# Patient Record
Sex: MALE | Race: WHITE | URBAN - METROPOLITAN AREA
[De-identification: names, ages, dates, MRNs, and addresses within clinical notes are randomized per-mention and may not be internally consistent; named-entity substitution may affect disease eponyms.]

---

## 2019-01-23 ENCOUNTER — OFFICE VISIT (OUTPATIENT)
Dept: ONCOLOGY | Facility: CLINIC | Age: 59
End: 2019-01-23
Attending: GENETIC COUNSELOR, MS
Payer: COMMERCIAL

## 2019-01-23 DIAGNOSIS — Z80.3 FAMILY HISTORY OF MALIGNANT NEOPLASM OF BREAST: ICD-10-CM

## 2019-01-23 DIAGNOSIS — Z80.49 FAMILY HISTORY OF UTERINE CANCER: ICD-10-CM

## 2019-01-23 DIAGNOSIS — Z80.42 FAMILY HISTORY OF PROSTATE CANCER: ICD-10-CM

## 2019-01-23 DIAGNOSIS — Z80.8 FAMILY HISTORY OF MALIGNANT MELANOMA: ICD-10-CM

## 2019-01-23 DIAGNOSIS — Z80.41 FAMILY HISTORY OF MALIGNANT NEOPLASM OF OVARY: ICD-10-CM

## 2019-01-23 DIAGNOSIS — Z80.0 FAMILY HISTORY OF PANCREATIC CANCER: ICD-10-CM

## 2019-01-23 DIAGNOSIS — Z84.81 FAMILY HISTORY OF BRCA GENE MUTATION: Primary | ICD-10-CM

## 2019-01-23 DIAGNOSIS — Z84.81 FAMILY HISTORY OF BRCA GENE MUTATION: ICD-10-CM

## 2019-01-23 LAB — MISCELLANEOUS TEST: NORMAL

## 2019-01-23 PROCEDURE — 96040 ZZH GENETIC COUNSELING, EACH 30 MINUTES: CPT | Mod: ZF | Performed by: GENETIC COUNSELOR, MS

## 2019-01-23 PROCEDURE — 36415 COLL VENOUS BLD VENIPUNCTURE: CPT

## 2019-01-23 NOTE — LETTER
Cancer Risk Management  Program Locations    [facility] Cancer [facility]  [facility] Cancer [facility]  Mailing Address  Cancer Risk Management Program  [facility]  [address]  [place] [address]    New patient appointments  [phone]  January 25, 2019    Genaro Hampton  [address] [address]      Dear Genaro,    It was a pleasure meeting with you at the [facility] on 1/23/2019.  Here is a copy of the progress note from your recent genetic counseling visit to the Cancer Risk Management Program.  If you have any additional questions, please feel free to call.    Referring Provider: self referred    Presenting Information:   I met with Genaro Memo today for genetic counseling at the Cancer Risk Management Program at the [facility] to discuss hist paternal family history of hereditary breast and ovarian cancer syndrome and maternal family history of pancreatic, breast and ovarian cancer.  He is here today to discuss available genetic testing options.  He was seen today with his daughter, Marlee.    Personal History:  Genaro is a 58 year old male.   He reports a history of non-melanoma skin cancer in his 50's, and sees a dermatologist annually.  He is regularly followed by his primary care provider, including prostate cancer screening.  Genaro began having colonoscopies at the age of 39, and reports one polyp from a colonoscopy completed 2 years ago; follow up was recommended in 5 years.  Genaro reported no tobacco use, and no concerns regarding alcohol use or environmental exposures.    Family History: (Please see scanned pedigree for detailed family history information)    Genaro has a 19 year old daughter and 15 year old son.      His mother was diagnosed with pancreatic cancer at age 63 and has  passed away    One maternal first cousin was diagnosed with breast cancer in her 60's and also has a history of ovarian cancer    His father was diagnosed with prostate cancer at age 70 and may have had a history of colon polyps    One paternal half sister was diagnosed with breast cancer at age 50 and uterine cancer at age 60.  She completed genetic testing which he reports found a BRCA gene mutation.  Additional details were not available today, however Genaro was encouraged to follow up should he obtain a copy of his sister's positive test report.    One paternal half sister was diagnosed with breast cancer at age 51 and melanoma in her teens.  Her daughter was diagnosed with breast cancer at age 38 and reportedly carries the familial BRCA1 or BRCA2 gene mutation.    One paternal uncle was diagnosed with throat cancer in his 70's.     His ethnicity is Romansh/Croatian/Bruneian. There is no known Ashkenazi Restoration ancestry on either side of his family.     Discussion:    Genaro has a paternal family history of Hereditary Breast and Ovarian Cancer syndrome.  His maternal family history of pancreatic, breast and ovarian cancer is also suggestive of a possible hereditary cancer syndrome.        We discussed the natural history and genetics of Hereditary Breast and Ovarian Cancer syndrome caused by mutations in the BRCA1/2 genes. We reviewed that mutations in these genes are inherited in an autosomal dominant pattern.  It is not clear whether this mutation identified in his paternal half sisters was inherited from Genaro's father.  This means that Genaro has a 25% chance of carrying the same mutation which was identified in his paternal half siblings.     Genaro's maternal family history, including his mother's history of pancreatic cancer and first cousin who has a history of breast and ovarian cancer, is also suggestive of a hereditary cancer syndrome.  Based on his maternal family history, Genaro meets current National  Comprehensive Caner Network (NCCN) guidelines for genetic testing of BRCA1 and BRCA2    We discussed that there are additional genes that could cause increased risk for pancreatic and ovarian cancer.  As many of these genes present with overlapping features in a family and accurate cancer risk cannot always be established based upon the pedigree analysis alone, it would be reasonable for Genaro to consider panel genetic testing to analyze multiple genes at once.    A detailed handout regarding hereditary breast and ovarian cancer and the information we discussed was provided to Genaro at the end of our appointment today and can be found in the after visit summary.  Topics included: inheritance pattern, cancer risks, cancer screening recommendations, and also risks, benefits and limitations of testing.    We discussed guidelines-based and expanded genetic testing options available to Genaro due to his maternal family history of pancreatic and ovarian cancer, and paternal family history of a BRCA1 or BRCA2 gene mutation.  Genaro expressed interest in learning as much information as possible from testing.  He elected to proceed with the CancerNext panel offered through [employer].  Genetic testing is available for 34 genes associated with hereditary cancer: CancerNext (APC, KARMEN, BARD1, BRCA1, BRCA2, BRIP1, BMPR1A, CDH1, CDK4, CDKN2A, CHEK2, DICER1, EPCAM, GREM1, HOXB13, MLH1, MRE11A, MSH2, MSH6, MUTYH, NBN, NF1, PALB2, PMS2, POLD1, POLE, PTEN, RAD50, RAD51C, RAD51D, SMAD4, SMARCA4, STK11, and TP53).  We discussed that many of the genes in the CancerNext panel have known risks and published management guidelines.  Some genes are associated with specific hereditary cancer syndromes: Hereditary Breast and Ovarian Cancer syndrome (BRCA1, BRCA2), Cartagena syndrome (MLH1, MSH2, MSH6, PMS2, EPCAM), Familial Adenomatous Polyposis syndrome (APC), Hereditary Gastric Cancer syndrome (CDH1), Familial Atypical Multiple Mole  Melanoma syndrome (CDK4, CDKN2A), Juvenile Polyposis syndrome (BMPR1A, SMAD4), Cowden syndrome (PTEN), Li Fraumeni syndrome (TP53), Peutz-Jeghers syndrome (STK11), MUTYH Associated Polyposis syndrome (MUTYH), and Neurofibromatosis type 1 (NF1).   The KARMEN, BRIP1, CHEK2, GREM1, NBN , PALB2, POLD1, POLE, RAD51C, and RAD51D genes are associated with increased cancer risk and have published management guidelines for certain cancers.    The remaining genes (BARD1, DICER1, HOXB13, MRE11A, RAD50, and SMARCA4) are associated with increased cancer risk and may allow us to make medical recommendations when mutations are identified.    Genaro was provided with a brochure from [employer] explaining the CancerNext testing.  Consent was obtained and genetic testing for CancerNext was sent to [facility]. Turn around time: 3-4 weeks.    Medical Management: For Genaro, we reviewed that the information from genetic testing may determine additional cancer screening  (i.e. male breast exams, more frequent colonoscopies, more frequent dermatologic exams, etc.) and targeted chemotherapies for certain cancers if needed in the future (i.e. Immunotherapy for individuals with Cartagena syndrome, PARP inhibitors, etc.).  We reviewed current NCCN screening recommendations for men who carry a mutation in the BRCA1 or BRCA2 genes.  These recommendations will be discussed in detail once genetic testing is completed.    Plan:  1) Today Genaro elected to proceed with the CancerNext gene panel offered through [employer].  2) This information should be available in 3-4 weeks.  3) Genaro should return to clinic in 4-5 weeks to discuss the results    Karishma Snider MS, [facility]  Licensed Genetic Counselor  [phone]

## 2019-01-23 NOTE — PATIENT INSTRUCTIONS
Assessing Cancer Risk  Only about 5-10% of cancers are thought to be due to an inherited cancer susceptibility gene.    These families often have:    Several people with the same or related types of cancer    Cancers diagnosed at a young age (before age 50)    Individuals with more than one primary cancer    Multiple generations of the family affected with cancer    Some people may be candidates for genetic testing of more than one gene.  For these families, genetic testing using a cancer panel may be offered.  These panels will test different genes known to increase the risk for breast, ovarian, uterine, and/or other cancers. All of the genes discussed below have published clinical management guidelines for individuals who are found to carry a mutation. The purpose of this handout is to serve as a brief summary of the genes analyzed by the panels used to inquire about hereditary breast and gynecologic cancer:  KARMEN, BRCA1, BRCA2, BRIP1, CDH1, CHEK2, MLH1, MSH2, MSH6, PMS2, EPCAM, PTEN, PALB2, RAD51C, RAD51D, and TP53.  ______________________________________________________________________________  Hereditary Breast and Ovarian Cancer Syndrome   (BRCA1 and BRCA2)  A single mutation in one of the copies of BRCA1 or BRCA2 increases the risk for breast and ovarian cancer, among others.  The risk for pancreatic cancer and melanoma may also be slightly increased in some families.  The chart below shows the chance that someone with a BRCA mutation would develop cancer in his or her lifetime1,2,3,4.        A person s ethnic background is also important to consider, as individuals of Ashkenazi Voodoo ancestry have a higher chance of having a BRCA gene mutation.  There are three BRCA mutations that occur more frequently in this population.    Cartagena Syndrome   (MLH1, MSH2, MSH6, PMS2, and EPCAM)  Currently five genes are known to cause Cartagena Syndrome: MLH1, MSH2, MSH6, PMS2, and EPCAM.  A single mutation in one of the  Cartagena Syndrome genes increases the risk for colon, endometrial, ovarian, and stomach cancers.  Other cancers that occur less commonly in Cartagena Syndrome include urinary tract, skin, and brain cancers.  The chart below shows the chance that a person with Cartagena syndrome would develop cancer in his or her lifetime5.      *Cancer risk varies depending on Cartagena syndrome gene found    Cowden Syndrome   (PTEN)  Cowden syndrome is a hereditary condition that increases the risk for breast, thyroid, endometrial, colon, and kidney cancer.  Cowden syndrome is caused by a mutation in the PTEN gene.  A single mutation in one of the copies of PTEN causes Cowden syndrome and increases cancer risk.  The chart below shows the chance that someone with a PTEN mutation would develop cancer in their lifetime6,7.  Other benign features seen in some individuals with Cowden syndrome include benign skin lesions (facial papules, keratoses, lipomas), learning disability, autism, thyroid nodules, colon polyps, and larger head size.      *One recent study found breast cancer risk to be increased to 85%    Li-Fraumeni Syndrome   (TP53)  Li-Fraumeni Syndrome (LFS) is a cancer predisposition syndrome caused by a mutation in the TP53 gene. A single mutation in one of the copies of TP53 increases the risk for multiple cancers. Individuals with LFS are at an increased risk for developing cancer at a young age. The lifetime risk for development of a LFS-associated cancer is 50% by age 30 and 90% by age 60.   Core Cancers: Sarcomas, Breast, Brain, Lung, Leukemias/Lymphomas, Adrenocortical carcinomas  Other Cancers: Gastrointestinal, Thyroid, Skin, Genitourinary    Hereditary Diffuse Gastric Cancer   (CDH1)  Currently, one gene is known to cause hereditary diffuse gastric cancer (HDGC): CDH1.  Individuals with HDGC are at increased risk for diffuse gastric cancer and lobular breast cancer. Of people diagnosed with HDGC, 30-50% have a mutation in the CDH1  gene.  This suggests there are likely other genes that may cause HDGC that have not been identified yet.      Lifetime Cancer Risks    General Population HDGC    Diffuse Gastric  <1% ~80%   Breast 12% 39-52%         Additional Genes  KARMEN  KARMEN is a moderate-risk breast cancer gene. Women who have a mutation in KARMEN can have between a 2-4 fold increased risk for breast cancer compared to the general population8. KARMEN mutations have also been associated with increased risk for pancreatic cancer, however an estimate of this cancer risk is not well understood9. Individuals who inherit two KARMEN mutations have a condition called ataxia-telangiectasia (AT).  This rare autosomal recessive condition affects the nervous system and immune system, and is associated with progressive cerebellar ataxia beginning in childhood.  Individuals with ataxia-telangiectasia often have a weakened immune system and have an increased risk for childhood cancers.    PALB2  Mutations in PALB2 have been shown to increase the risk of breast cancer up to 33-58% in some families; where individuals fall within this risk range is dependent upon family [id number]. PALB2 mutations have also been associated with increased risk for pancreatic cancer, although this risk has not been quantified yet.  Individuals who inherit two PALB2 mutations--one from their mother and one from their father--have a condition called Fanconi Anemia.  This rare autosomal recessive condition is associated with short stature, developmental delay, bone marrow failure, and increased risk for childhood cancers.    CHEK2   CHEK2 is a moderate-risk breast cancer gene.  Women who have a mutation in CHEK2 have around a 2-fold increased risk for breast cancer compared to the general population, and this risk may be higher depending upon family history.11,12,13 Mutations in CHEK2 have also been shown to increase the risk of a number of other cancers, including colon and prostate, however  these cancer risks are currently not well understood.    BRIP1, RAD51C and RAD51D  Mutations in BRIP1, RAD51C, and RAD51D have been shown to increase the risk of ovarian cancer and possibly female breast cancer as well14,15 .       Lifetime Cancer Risk    General Population BRIP1 RAD51C RAD51D   Ovarian 1-2% ~5-8% ~5-9% ~7-15%           Inheritance  All of the cancer syndromes reviewed above are inherited in an autosomal dominant pattern.  This means that if a parent has a mutation, each of his or her children will have a 50% chance of inheriting that same mutation.  Therefore, each child--male or female--would have a 50% chance of being at increased risk for developing cancer.      Image obtained from Genetics Home Reference, 2013     Mutations in some genes can occur de ary, which means that a person s mutation occurred for the first time in them and was not inherited from a parent.  Now that they have the mutation, however, it can be passed on to future generations.    Genetic Testing  Genetic testing involves a blood test and will look at the genetic information in the KARMEN, BRCA1, BRCA2, BRIP1, CDH1, CHEK2, MLH1, MSH2, MSH6, PMS2, EPCAM, PTEN, PALB2, RAD51C, RAD51D, and TP53 genes for any harmful mutations that are associated with increased cancer risk.  If possible, it is recommended that the person(s) who has had cancer be tested before other family members.  That person will give us the most useful information about whether or not a specific gene is associated with the cancer in the family.    Results  There are three possible results of genetic testing:    Positive--a harmful mutation was identified in one or more of the genes    Negative--no mutation was identified in any of the genes on this panel    Variant of unknown significance--a variation in one of the genes was identified, but it is unclear how this impacts cancer risk in the family    Advantages and Disadvantages   There are advantages and  disadvantages to genetic testing.    Advantages    May clarify your cancer risk    Can help you make medical decisions    May explain the cancers in your family    May give useful information to your family members (if you share your results)    Disadvantages    Possible negative emotional impact of learning about inherited cancer risk    Uncertainty in interpreting a negative test result in some situations    Possible genetic discrimination concerns (see below)    Genetic Information Nondiscrimination Act (HAJA)  HAJA is a federal law that protects individuals from health insurance or employment discrimination based on a genetic test result alone.  Although rare, there are currently no legal discrimination protections in terms of life insurance, long term care, or disability insurances.  Visit the [employer] [place] website to learn more.    Reducing Cancer Risk  All of the genes described above have nationally recognized cancer screening guidelines that would be recommended for individuals who test positive.  In addition to increased cancer screening, surgeries may be offered or recommended to reduce cancer risk.  Recommendations are based upon an individual s genetic test result as well as their personal and family history of cancer.    Questions to Think About Regarding Genetic Testing:    What effect will the test result have on me and my relationship with my family members if I have an inherited gene mutation?  If I don t have a gene mutation?    Should I share my test results, and how will my family react to this news, which may also affect them?    Are my children ready to learn new information that may one day affect their own health?    Hereditary Cancer Resources    FORCE: Facing Our Risk of Cancer Empowered facingourrisk.org   Bright Pink bebrightpink.org   Li-Fraumeni Syndrome Association lfsassociation.org   PTEN World PTENworld.com   No stomach for cancer, Inc.  nostomachforcancer.org   Stomach cancer relief network Scrnet.org   Collaborative Group of the Americas on Inherited Colorectal Cancer (CGA) cgaicc.com    Cancer Care cancercare.org   American Cancer Society (ACS) cancer.org   National Cancer [place] (NCI) cancer.gov     Please call us if you have any questions or concerns.   Cancer Risk Management Program [phone] ([phone])  ? Yina Burns, MS, [facility]  [phone]  ? Diamond Basurto, MS, [facility]  [phone]  ? Karishma Snider, MS, [facility]  [phone]  ? Dale Yañez, MS, [facility]  [phone]  ? Areli Xavi, MS, [facility] [phone]    References  1. Spencer CLIFTON, Dada PDP, Augusta S, Chandana MIXON, Ron JE, Cristal JL, Flo N, Donis H, Rashi O, Phil A, Rakesh B, Phoenix P, Cora S, Pb DM, Steven N, Vasu E, Suri H, Justus E, Lubinski J, Gronwald J, Cal B, Tulinius H, Thorlacius S, Eerola H, Won H, Darlin K, Brunilda OP. Average risks of breast and ovarian cancer associated with BRCA1 or BRCA2 mutations detected in case series unselected for family history: a combined analysis of 222 studies. Am J Hum Aysha. 2003;72:1117-30.  2. Tanya N, Deja M, Radha G.  BRCA1 and BRCA2 Hereditary Breast and Ovarian Cancer. Gene Reviews online. 2013.  3. Hilario YC, Mónica S, Haleigh G, Warren S. Breast cancer risk among male BRCA1 and BRCA2 mutation carriers. J Natl Cancer Inst. 2007;99:1811-4.  4. Flako RAMÍREZ, Leann I, John J, Joby E, Adelina ER, Jose F. Risk of breast cancer in male BRCA2 carriers. J Med Aysha. 2010;47:710-1.  5. National Comprehensive Cancer Network. Clinical practice guidelines in oncology, colorectal cancer screening. Available online (registration required). 2015.  6. Dhruv ASHTON, Juan A J, Babak J, Raoul LA, Andrei MS, Eng C. Lifetime cancer risks in individuals with germline PTEN mutations. Clin Cancer Res. 2012;18:400-7.  7. Mundo ANN. Cowden Syndrome: A Critical Review of the Clinical Literature. J Aysha .  2009:18:13-27.  8. Bella A, Livan D, Baron S, Francheska P, Hank T, Gino M, Earnest B, Jairo H, Rahat R, Edwige K, Everardo L, Flako DG, Pb D, Bryan DF, Venus MR, The Breast Cancer Susceptibility Collaboration () & Abbe MAHER. KARMEN mutations that cause ataxia-telangiectasia are breast cancer susceptibility alleles. Nature Genetics. 2006;38:873-875  9. Enrrique N , Chava Y, Ritu J, Tom L, Audie GM , Dhruv ML, Gallinger S, Trevino AG, Syngal S, Nanci ML, Jerrell J , Mariah R, Jama SZ, Ting JR, Dodie VE, Miriam M, Vojoe B, Sunny N, Abdelrahman RH, Radha KW, and Sintia AP. KARMEN mutations in patients with hereditary pancreatic cancer. Cancer Discover. 2012;2:41-46  10. Spencer MUKHERJEE, et al. Breast-Cancer Risk in Families with Mutations in PALB2. NEJM. 2014; 371(6):497-506.  11. CHEK2 Breast Cancer Case-Control Consortium. CHEK2*1100delC and susceptibility to breast cancer: A collaborative analysis involving 10,860 breast cancer cases and 9,065 controls from 10 studies. Am J Hum Aysha, 74 (2004), pp. 4908-6637  12. Elida T, Denice S, Sergio K, et al. Spectrum of Mutations in BRCA1, BRCA2, CHEK2, and TP53 in Families at High Risk of Breast Cancer. ARMINDA. 2006;295(12):1305-2773.   13. Jf C, Kerwin D, Eduardo A, et al. Risk of breast cancer in women with a CHEK2 mutation with and without a family history of breast cancer. J Clin Oncol. 2011;29:1128-3526.  14. Benjamin KHAN, Pollo E, Ash SJ, et al. Contribution of germline mutations in the RAD51B, RAD51C, and RAD51D genes to ovarian cancer in the population. J Clin Oncol. 2015;33(26):9034-9254. Doi:10.1200/JCO.2015.61.2408.  15. Jake T, Cecilio HIRSCH, Robbie P, et al. Mutations in BRIP1 confer high risk of ovarian cancer. Sakina Aysha. 2011;43(11):6289-5137. doi:10.1038/ng.955.

## 2019-01-23 NOTE — LETTER
1/23/2019       RE: Genaro Hampton  [address] [address]     Dear Colleague,    Thank you for referring your patient, Genaro Hampton, to the [facility]. Please see a copy of my visit note below.    1/23/2019    Referring Provider: self referred    Presenting Information:   I met with Genaro Hampton today for genetic counseling at the Cancer Risk Management Program at the [facility] to discuss hist paternal family history of hereditary breast and ovarian cancer syndrome and maternal family history of pancreatic, breast and ovarian cancer.  He is here today to discuss available genetic testing options.  He was seen today with his daughter, Marlee.    Personal History:  Genaro is a 58 year old male.   He reports a history of non-melanoma skin cancer in his 50's, and sees a dermatologist annually.  He is regularly followed by his primary care provider, including prostate cancer screening.  Genaro began having colonoscopies at the age of 39, and reports one polyp from a colonoscopy completed 2 years ago; follow up was recommended in 5 years.  Genaro reported no tobacco use, and no concerns regarding alcohol use or environmental exposures.    Family History: (Please see scanned pedigree for detailed family history information)    Genaro has a 19 year old daughter and 15 year old son.      His mother was diagnosed with pancreatic cancer at age 63 and has passed away    One maternal first cousin was diagnosed with breast cancer in her 60's and also has a history of ovarian cancer    His father was diagnosed with prostate cancer at age 70 and may have had a history of colon polyps    One paternal half sister was diagnosed with breast cancer at age 50 and uterine cancer at age 60.  She completed genetic testing which he reports found a BRCA gene mutation.  Additional details were not available today, however Genaro was encouraged to follow up should he obtain a copy  of his sister's positive test report.    One paternal half sister was diagnosed with breast cancer at age 51 and melanoma in her teens.  Her daughter was diagnosed with breast cancer at age 38 and reportedly carries the familial BRCA1 or BRCA2 gene mutation.    One paternal uncle was diagnosed with throat cancer in his 70's.     His ethnicity is Colombian/Ind[place]ian/Greenlandic. There is no known Ashkenazi Hinduism ancestry on either side of his family.     Discussion:    Genaro has a paternal family history of Hereditary Breast and Ovarian Cancer syndrome.  His maternal family history of pancreatic, breast and ovarian cancer is also suggestive of a possible hereditary cancer syndrome.        We discussed the natural history and genetics of Hereditary Breast and Ovarian Cancer syndrome caused by mutations in the BRCA1/2 genes. We reviewed that mutations in these genes are inherited in an autosomal dominant pattern.  It is not clear whether this mutation identified in his paternal half sisters was inherited from Genaro's father.  This means that Genaro has a 25% chance of carrying the same mutation which was identified in his paternal half siblings.     Genaro's maternal family history, including his mother's history of pancreatic cancer and first cousin who has a history of breast and ovarian cancer, is also suggestive of a hereditary cancer syndrome.  Based on his maternal family history, Genaro meets current National Comprehensive Caner Network (NCCN) guidelines for genetic testing of BRCA1 and BRCA2    We discussed that there are additional genes that could cause increased risk for pancreatic and ovarian cancer.  As many of these genes present with overlapping features in a family and accurate cancer risk cannot always be established based upon the pedigree analysis alone, it would be reasonable for Genaro to consider panel genetic testing to analyze multiple genes at once.    A detailed handout regarding hereditary breast  and ovarian cancer and the information we discussed was provided to Genaro at the end of our appointment today and can be found in the after visit summary.  Topics included: inheritance pattern, cancer risks, cancer screening recommendations, and also risks, benefits and limitations of testing.    We discussed guidelines-based and expanded genetic testing options available to Genaro due to his maternal family history of pancreatic and ovarian cancer, and paternal family history of a BRCA1 or BRCA2 gene mutation.  Genaro expressed interest in learning as much information as possible from testing.  He elected to proceed with the CancerNext panel offered through [employer].  Genetic testing is available for 34 genes associated with hereditary cancer: CancerNext (APC, KARMEN, BARD1, BRCA1, BRCA2, BRIP1, BMPR1A, CDH1, CDK4, CDKN2A, CHEK2, DICER1, EPCAM, GREM1, HOXB13, MLH1, MRE11A, MSH2, MSH6, MUTYH, NBN, NF1, PALB2, PMS2, POLD1, POLE, PTEN, RAD50, RAD51C, RAD51D, SMAD4, SMARCA4, STK11, and TP53).  We discussed that many of the genes in the CancerNext panel have known risks and published management guidelines.  Some genes are associated with specific hereditary cancer syndromes: Hereditary Breast and Ovarian Cancer syndrome (BRCA1, BRCA2), Cartagena syndrome (MLH1, MSH2, MSH6, PMS2, EPCAM), Familial Adenomatous Polyposis syndrome (APC), Hereditary Gastric Cancer syndrome (CDH1), Familial Atypical Multiple Mole Melanoma syndrome (CDK4, CDKN2A), Juvenile Polyposis syndrome (BMPR1A, SMAD4), Cowden syndrome (PTEN), Li Fraumeni syndrome (TP53), Peutz-Jeghers syndrome (STK11), MUTYH Associated Polyposis syndrome (MUTYH), and Neurofibromatosis type 1 (NF1).   The KARMEN, BRIP1, CHEK2, GREM1, NBN , PALB2, POLD1, POLE, RAD51C, and RAD51D genes are associated with increased cancer risk and have published management guidelines for certain cancers.    The remaining genes (BARD1, DICER1, HOXB13, MRE11A, RAD50, and SMARCA4) are associated  with increased cancer risk and may allow us to make medical recommendations when mutations are identified.    Genaro was provided with a brochure from [employer] explaining the CancerNext testing.  Consent was obtained and genetic testing for CancerNext was sent to [facility]. Turn around time: 3-4 weeks.    Medical Management: For Genaro, we reviewed that the information from genetic testing may determine additional cancer screening  (i.e. male breast exams, more frequent colonoscopies, more frequent dermatologic exams, etc.) and targeted chemotherapies for certain cancers if needed in the future (i.e. Immunotherapy for individuals with Cartagena syndrome, PARP inhibitors, etc.).  We reviewed current NCCN screening recommendations for men who carry a mutation in the BRCA1 or BRCA2 genes.  These recommendations will be discussed in detail once genetic testing is completed.    Plan:  1) Today Genaro elected to proceed with the CancerNext gene panel offered through [facility].  2) This information should be available in 3-4 weeks.  3) Genaro should return to clinic in 4-5 weeks to discuss the results    Face to face time: 45 minutes    Karishma Snider MS, [facility]  Licensed Genetic Counselor  [phone]

## 2019-01-23 NOTE — PROGRESS NOTES
1/23/2019    Referring Provider: self referred    Presenting Information:   I met with Genaro Hampton today for genetic counseling at the Cancer Risk Management Program at the [facility] to discuss hist paternal family history of hereditary breast and ovarian cancer syndrome and maternal family history of pancreatic, breast and ovarian cancer.  He is here today to discuss available genetic testing options.  He was seen today with his daughter, Marlee.    Personal History:  Genaro is a 58 year old male.   He reports a history of non-melanoma skin cancer in his 50's, and sees a dermatologist annually.  He is regularly followed by his primary care provider, including prostate cancer screening.  Genaro began having colonoscopies at the age of 39, and reports one polyp from a colonoscopy completed 2 years ago; follow up was recommended in 5 years.  Genaro reported no tobacco use, and no concerns regarding alcohol use or environmental exposures.    Family History: (Please see scanned pedigree for detailed family history information)    Genaro has a 19 year old daughter and 15 year old son.      His mother was diagnosed with pancreatic cancer at age 63 and has passed away    One maternal first cousin was diagnosed with breast cancer in her 60's and also has a history of ovarian cancer    His father was diagnosed with prostate cancer at age 70 and may have had a history of colon polyps    One paternal half sister was diagnosed with breast cancer at age 50 and uterine cancer at age 60.  She completed genetic testing which he reports found a BRCA gene mutation.  Additional details were not available today, however Genaro was encouraged to follow up should he obtain a copy of his sister's positive test report.    One paternal half sister was diagnosed with breast cancer at age 51 and melanoma in her teens.  Her daughter was diagnosed with breast cancer at age 38 and reportedly carries the familial BRCA1 or BRCA2  gene mutation.    One paternal uncle was diagnosed with throat cancer in his 70's.     His ethnicity is Z[place]ian/Slovak/Icelandic. There is no known Ashkenazi Congregation ancestry on either side of his family.     Discussion:    Genaro has a paternal family history of Hereditary Breast and Ovarian Cancer syndrome.  His maternal family history of pancreatic, breast and ovarian cancer is also suggestive of a possible hereditary cancer syndrome.        We discussed the natural history and genetics of Hereditary Breast and Ovarian Cancer syndrome caused by mutations in the BRCA1/2 genes. We reviewed that mutations in these genes are inherited in an autosomal dominant pattern.  It is not clear whether this mutation identified in his paternal half sisters was inherited from Genaro's father.  This means that Genaro has a 25% chance of carrying the same mutation which was identified in his paternal half siblings.     Genaro's maternal family history, including his mother's history of pancreatic cancer and first cousin who has a history of breast and ovarian cancer, is also suggestive of a hereditary cancer syndrome.  Based on his maternal family history, Genaro meets current National Comprehensive Caner Network (NCCN) guidelines for genetic testing of BRCA1 and BRCA2    We discussed that there are additional genes that could cause increased risk for pancreatic and ovarian cancer.  As many of these genes present with overlapping features in a family and accurate cancer risk cannot always be established based upon the pedigree analysis alone, it would be reasonable for Genaro to consider panel genetic testing to analyze multiple genes at once.    A detailed handout regarding hereditary breast and ovarian cancer and the information we discussed was provided to Genaro at the end of our appointment today and can be found in the after visit summary.  Topics included: inheritance pattern, cancer risks, cancer screening recommendations,  and also risks, benefits and limitations of testing.    We discussed guidelines-based and expanded genetic testing options available to Genaro due to his maternal family history of pancreatic and ovarian cancer, and paternal family history of a BRCA1 or BRCA2 gene mutation.  Genaro expressed interest in learning as much information as possible from testing.  He elected to proceed with the CancerNext panel offered through [facility].  Genetic testing is available for 34 genes associated with hereditary cancer: CancerNext (APC, KARMEN, BARD1, BRCA1, BRCA2, BRIP1, BMPR1A, CDH1, CDK4, CDKN2A, CHEK2, DICER1, EPCAM, GREM1, HOXB13, MLH1, MRE11A, MSH2, MSH6, MUTYH, NBN, NF1, PALB2, PMS2, POLD1, POLE, PTEN, RAD50, RAD51C, RAD51D, SMAD4, SMARCA4, STK11, and TP53).  We discussed that many of the genes in the CancerNext panel have known risks and published management guidelines.  Some genes are associated with specific hereditary cancer syndromes: Hereditary Breast and Ovarian Cancer syndrome (BRCA1, BRCA2), Cartagena syndrome (MLH1, MSH2, MSH6, PMS2, EPCAM), Familial Adenomatous Polyposis syndrome (APC), Hereditary Gastric Cancer syndrome (CDH1), Familial Atypical Multiple Mole Melanoma syndrome (CDK4, CDKN2A), Juvenile Polyposis syndrome (BMPR1A, SMAD4), Cowden syndrome (PTEN), Li Fraumeni syndrome (TP53), Peutz-Jeghers syndrome (STK11), MUTYH Associated Polyposis syndrome (MUTYH), and Neurofibromatosis type 1 (NF1).   The KARMEN, BRIP1, CHEK2, GREM1, NBN , PALB2, POLD1, POLE, RAD51C, and RAD51D genes are associated with increased cancer risk and have published management guidelines for certain cancers.    The remaining genes (BARD1, DICER1, HOXB13, MRE11A, RAD50, and SMARCA4) are associated with increased cancer risk and may allow us to make medical recommendations when mutations are identified.    Genaro was provided with a brochure from [employer] explaining the CancerNext testing.  Consent was obtained and genetic testing  for CancerNext was sent to [facility]. Turn around time: 3-4 weeks.    Medical Management: For Genaro, we reviewed that the information from genetic testing may determine additional cancer screening  (i.e. male breast exams, more frequent colonoscopies, more frequent dermatologic exams, etc.) and targeted chemotherapies for certain cancers if needed in the future (i.e. Immunotherapy for individuals with Cartagena syndrome, PARP inhibitors, etc.).  We reviewed current NCCN screening recommendations for men who carry a mutation in the BRCA1 or BRCA2 genes.  These recommendations will be discussed in detail once genetic testing is completed.    Plan:  1) Today Genaro elected to proceed with the CancerNext gene panel offered through [facility].  2) This information should be available in 3-4 weeks.  3) Genaro should return to clinic in 4-5 weeks to discuss the results    Face to face time: 45 minutes    Karishma Snider MS, [facility]  Licensed Genetic Counselor  [phone]

## 2019-01-23 NOTE — NURSING NOTE
Chief Complaint   Patient presents with     Lab Only     Genetics labs drawn from venipuncture by RN in lab.     Judi Nelson RN

## 2019-02-12 LAB — LAB SCANNED RESULT: NORMAL

## 2019-03-29 ENCOUNTER — OFFICE VISIT (OUTPATIENT)
Dept: ONCOLOGY | Facility: CLINIC | Age: 59
End: 2019-03-29
Attending: GENETIC COUNSELOR, MS
Payer: COMMERCIAL

## 2019-03-29 DIAGNOSIS — Z80.8 FAMILY HISTORY OF MALIGNANT MELANOMA: ICD-10-CM

## 2019-03-29 DIAGNOSIS — Z80.49 FAMILY HISTORY OF UTERINE CANCER: ICD-10-CM

## 2019-03-29 DIAGNOSIS — Z80.42 FAMILY HISTORY OF PROSTATE CANCER: ICD-10-CM

## 2019-03-29 DIAGNOSIS — Z80.0 FAMILY HISTORY OF PANCREATIC CANCER: ICD-10-CM

## 2019-03-29 DIAGNOSIS — Z80.41 FAMILY HISTORY OF MALIGNANT NEOPLASM OF OVARY: ICD-10-CM

## 2019-03-29 DIAGNOSIS — Z80.3 FAMILY HISTORY OF MALIGNANT NEOPLASM OF BREAST: ICD-10-CM

## 2019-03-29 DIAGNOSIS — Z84.81 FAMILY HISTORY OF BRCA GENE MUTATION: Primary | ICD-10-CM

## 2019-03-29 PROCEDURE — 40000072 ZZH STATISTIC GENETIC COUNSELING, < 16 MIN: Mod: ZF | Performed by: GENETIC COUNSELOR, MS

## 2019-03-29 NOTE — LETTER
Cancer Risk Management  Program Locations    [facility]  [facility]  Mailing Address  Cancer Risk Management Program  [address]  [place] [address]    New patient appointments  [phone]  April 1, 2019    [address]aro [address]ko  [address] [address]      Dear Genaro,    It was a pleasure meeting with you at the [facility] on 3/29/2019.  Here is a copy of the progress note from your recent genetic counseling visit to the Cancer Risk Management Program.  If you have any additional questions, please feel free to call.    Referring Provider: self referred    Presenting Information:  Genaro returned to the Cancer Risk Management Program at the [facility] with his daughter, Marlee, to discuss his genetic testing results.  His blood was drawn on 1/23/2019 and CancerNext testing was ordered from [facility]. This testing was done because of his family history of several cancers, including breast, uterine, pancreatic and ovarian.    Genetic Testing Result: Multiple Variants of Uncertain Significance (VUS)  Genaor was found to have two variants of uncertain significance (VUS).    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 Given the uncertain significance of this result, medical management decisions should NOT be made based on this test result alone.    Of note, Genaro tested negative for mutations in the  APC, KARMEN, BARD1, BRCA1, BRCA2, BRIP1, BMPR1A, CDH1, CDK4, CDKN2A, CHEK2, DICER1, EPCAM, HOXB13, GREM1, MLH1, MRE11A, MSH2, MSH6, MUTYH, NBN, NF1, PALB2, PMS2, POLD1, POLE, PTEN, RAD50, RAD51C, RAD51D, SMAD4, SMARCA4, STK11, and  TP53 genes. No mutations were found in any of the 34 genes analyzed. This test involved sequencing and deletion/duplication analysis of all genes with the exception of EPCAM and GREM1 (deletions only).    Interpretation:  We discussed several different interpretations of this inconclusive test result. It is not clear if any of these variants are associated with increased cancer risk.  1. These variants may be a benign change that does not increase cancer risk.  2. These variants may be a harmful mutation associated with an increased risk for certain cancers/tumors.    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       Pathogenic (harmful) mutations in this gene are associated with DICER1 syndrome, also known as DICER1-pleuropulmonary blastoma familial tumor predisposition syndrome.     This syndrome is characterized by benign and malignant tumors, including pleuropulmonary blastoma, cystic nephroma, ovarian sex cord stromal tumors, multinodular goiter and thyroid cancer, embryonal rhabdomyosarcomas, ciliary body medulloepithelioma, nasal chondromesenchymal hamartomas, pituitary blastoma, as well as various other tumor types.      Due to the rarity of DICER1 syndrome and the fact that some individuals with a pathogenic variant will never develop symptoms, the specific lifetime risk of developing DICER1-related tumors is unknown. Many of the tumors develop in childhood and most present before the age of 40.    PALB2 variant of unknown significance p.S537L (c.1610C>T)    PALB2 mutations increase the lifetime risk of female breast cancer to approximately 33-58% over a woman s lifetime, compared to 12% in the general population.      PALB2 mutations have also been associated with an increased risk for pancreatic and possibly ovarian  cancer.    If individuals are found to have a mutation in the PALB2 gene, we would recommend increased breast cancer screening at a younger age. Additional screening (such as pancreatic) may be considered based on family history.    In rare situations in which both parents have a harmful mutation in the PALB2 gene, their children each have a 25% risk for Fanconi anemia. Fanconi anemia is a rare condition with onset in childhood.  Fanconi anemia often results in physical abnormalities, growth delays, bone marrow failure, and increased risk for cancer.  If this variant is later classified as harmful, Genaro would be considered a carrier for Fanconi Anemia. For individuals of childbearing age with PALB2 mutations, genetic counseling and genetic testing may be advised for their partners.    The laboratory is working to determine if any of these variants are harmful or benign, and they will contact me if any of them are reclassified. If these variants are determined to be benign, there may be a different gene or combination of genes and environment that are associated with the cancers in Genaro's relatives.  It is also important to consider that one of his close relatives may have had a mutation in one of the genes tested and he did not inherit it.      Inheritance:  We reviewed the autosomal dominant inheritance of the variants in the DICER1 and PALB2 genes.  We discussed that Genaro has a 50% chance to pass each of these variants to each of his children. Likewise, there is a 50% chance for each of these variants to be present in each of his siblings. Because it is unclear what, if any, risk is associated with these variants, clinical genetic testing for these two variants alone is not recommended for relatives.    Family Studies:  The laboratory may offer additional research based testing for specific relatives in order to help reclassify these variants.    Screening:  Based on this inconclusive test result, it is  important for Genaro and his relatives to refer back to the family history for appropriate cancer screening.      We discussed that Genaro likely has some increased risk for pancreatic given his mother's history, but routine screening is typically not recommended at this time.  Genaro is encouraged to discuss this history with his care providers.        Other population cancer screening options, such as those recommended by the American Cancer Society and the National Comprehensive Cancer Network (NCCN), are also appropriate for Genaro at this time. These screening recommendations may change if there are changes to Genaro's personal and/or family history. Final screening recommendations should be made by each individual's managing physician.    Additional Testing Considerations:  Although Genaro's genetic testing result is inconclusive, other relatives may still carry a harmful gene mutation associated with an increased risk for cancer. Of note, his paternal half sister did complete genetic testing which identified a BRCA1 or BRCA2 gene mutation, which was reportedly inherited from their mother (not blood related to Genaro).  Genetic counseling is recommended for his maternal first cousin who was diagnosed with breast and ovarian cancer, as well as his full sister due to their mother's history of pancreatic cancer.  If any of these relatives do pursue additional genetic testing, Genaro is encouraged to contact me so that we may review the impact of their test results on Genaro.    Summary:  It is important that Genaro continue with cancer screening based on his personal and family history as discussed above.  Genetic testing is rapidly advancing, and new cancer susceptibility genes will most likely be identified in the future. Therefore, I encouraged Genaro to contact me annually or if there are changes in his personal or family history. This may change how we assess his cancer risk, screening, and the testing we  would offer.    Plan:  1.  I provided Genaro with a copy of his test results today.    2. He should follow-up with his primary care provider for management.  3. He should contact me annually, or sooner if his family history changes.  4. I will contact Genaro if the laboratory informs me that these variants have been reclassified.  This may change screening and testing recommendations for Genaro and his relatives.    If Genaro has any further questions, I encouraged him to contact me at [phone].    Karishma Snider MS, [facility]  Licensed Genetic Counselor  [phone]

## 2019-03-29 NOTE — PROGRESS NOTES
"3/29/2019    Referring Provider: self referred    Presenting Information:  Genaro returned to the Cancer Risk Management Program at the [facility] with his daughter, Marlee, to discuss his genetic testing results.  His blood was drawn on 1/23/2019 and CancerNext testing was ordered from [facility]. This testing was done because of his family history of several cancers, including breast, uterine, pancreatic and ovarian.    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 Given the uncertain significance of this result, medical management decisions should NOT be made based on this test result alone.    Of note, Genaro tested negative for mutations in the  APC, KARMEN, BARD1, BRCA1, BRCA2, BRIP1, BMPR1A, CDH1, CDK4, CDKN2A, CHEK2, DICER1, EPCAM, HOXB13, GREM1, MLH1, MRE11A, MSH2, MSH6, MUTYH, NBN, NF1, PALB2, PMS2, POLD1, POLE, PTEN, RAD50, RAD51C, RAD51D, SMAD4, SMARCA4, STK11, and TP53 genes. No mutations were found in any of the 34 genes analyzed. This test involved sequencing and deletion/duplication analysis of all genes with the exception of EPCAM and GREM1 (deletions only).    A copy of the test report can be found in the Laboratory tab, dated 1/23/2019, and named \"SEND OUTS MISC TEST\". The report is scanned in as a linked document.    Interpretation:  We discussed several different interpretations of this inconclusive test result. It is not clear if any of these variants are associated with increased cancer risk.  1. These variants may be a benign change that does not increase cancer risk.  2. These variants may be a harmful mutation associated with an increased risk for certain cancers/tumors.    DICER1 variant of " unknown significance p.P375R (c.1124C>G)  Harmful mutations in the DICER1 gene cause a rare tumor predisposition syndrome characterized by benign and malignant tumors in the lungs, kidneys, ovaries and thyroid.       Pathogenic (harmful) mutations in this gene are associated with DICER1 syndrome, also known as DICER1-pleuropulmonary blastoma familial tumor predisposition syndrome.     This syndrome is characterized by benign and malignant tumors, including pleuropulmonary blastoma, cystic nephroma, ovarian sex cord stromal tumors, multinodular goiter and thyroid cancer, embryonal rhabdomyosarcomas, ciliary body medulloepithelioma, nasal chondromesenchymal hamartomas, pituitary blastoma, as well as various other tumor types.      Due to the rarity of DICER1 syndrome and the fact that some individuals with a pathogenic variant will never develop symptoms, the specific lifetime risk of developing DICER1-related tumors is unknown. Many of the tumors develop in childhood and most present before the age of 40.    PALB2 variant of unknown significance p.S537L (c.1610C>T)    PALB2 mutations increase the lifetime risk of female breast cancer to approximately 33-58% over a woman s lifetime, compared to 12% in the general population.      PALB2 mutations have also been associated with an increased risk for pancreatic and possibly ovarian cancer.    If individuals are found to have a mutation in the PALB2 gene, we would recommend increased breast cancer screening at a younger age. Additional screening (such as pancreatic) may be considered based on family history.    In rare situations in which both parents have a harmful mutation in the PALB2 gene, their children each have a 25% risk for Fanconi anemia. Fanconi anemia is a rare condition with onset in childhood.  Fanconi anemia often results in physical abnormalities, growth delays, bone marrow failure, and increased risk for cancer.  If this variant is later classified as  harmful, Genaro would be considered a carrier for Fanconi Anemia. For individuals of childbearing age with PALB2 mutations, genetic counseling and genetic testing may be advised for their partners.    The laboratory is working to determine if any of these variants are harmful or benign, and they will contact me if any of them are reclassified. If these variants are determined to be benign, there may be a different gene or combination of genes and environment that are associated with the cancers in Genaro's relatives.  It is also important to consider that one of his close relatives may have had a mutation in one of the genes tested and he did not inherit it.      Inheritance:  We reviewed the autosomal dominant inheritance of the variants in the DICER1 and PALB2 genes.  We discussed that Genaro has a 50% chance to pass each of these variants to each of his children. Likewise, there is a 50% chance for each of these variants to be present in each of his siblings. Because it is unclear what, if any, risk is associated with these variants, clinical genetic testing for these two variants alone is not recommended for relatives.    Family Studies:  The laboratory may offer additional research based testing for specific relatives in order to help reclassify these variants.    Screening:  Based on this inconclusive test result, it is important for Genaro and his relatives to refer back to the family history for appropriate cancer screening.      We discussed that Genaro likely has some increased risk for pancreatic given his mother's history, but routine screening is typically not recommended at this time.  Genaro is encouraged to discuss this history with his care providers.        Other population cancer screening options, such as those recommended by the American Cancer Society and the National Comprehensive Cancer Network (NCCN), are also appropriate for Genaro at this time. These screening recommendations may change if  there are changes to Genaro's personal and/or family history. Final screening recommendations should be made by each individual's managing physician.    Additional Testing Considerations:  Although Genaro's genetic testing result is inconclusive, other relatives may still carry a harmful gene mutation associated with an increased risk for cancer. Of note, his paternal half sister did complete genetic testing which identified a BRCA1 or BRCA2 gene mutation, which was reportedly inherited from their mother (not blood related to Genaro).  Genetic counseling is recommended for his maternal first cousin who was diagnosed with breast and ovarian cancer, as well as his full sister due to their mother's history of pancreatic cancer.  If any of these relatives do pursue additional genetic testing, Genaro is encouraged to contact me so that we may review the impact of their test results on Genaro.    Summary:  It is important that Genaro continue with cancer screening based on his personal and family history as discussed above.  Genetic testing is rapidly advancing, and new cancer susceptibility genes will most likely be identified in the future. Therefore, I encouraged Genaro to contact me annually or if there are changes in his personal or family history. This may change how we assess his cancer risk, screening, and the testing we would offer.    Plan:  1.  I provided Genaro with a copy of his test results today.    2. He should follow-up with his primary care provider for management.  3. He should contact me annually, or sooner if his family history changes.  4. I will contact Genaro if the laboratory informs me that these variants have been reclassified.  This may change screening and testing recommendations for Genaro and his relatives.    If Genaro has any further questions, I encouraged him to contact me at [phone].    Time spent face to face: 15 minutes    Karishma Snider MS, [facility]  Licensed Genetic  Counselor  [phone]

## 2019-03-29 NOTE — LETTER
"3/29/2019       RE: Genaro Hampton  [address] [address]     Dear Colleague,    Thank you for referring your patient, Genaro Hampton, to the [facility]. Please see a copy of my visit note below.    3/29/2019    Referring Provider: self referred    Presenting Information:  Genaro returned to the Cancer Risk Management Program at the [facility] with his daughter, Marlee, to discuss his genetic testing results.  His blood was drawn on 1/23/2019 and CancerNext testing was ordered from [facility]. This testing was done because of his family history of several cancers, including breast, uterine, pancreatic and ovarian.    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 Given the uncertain significance of this result, medical management decisions should NOT be made based on this test result alone.    Of note, Genaro tested negative for mutations in the  APC, KARMEN, BARD1, BRCA1, BRCA2, BRIP1, BMPR1A, CDH1, CDK4, CDKN2A, CHEK2, DICER1, EPCAM, HOXB13, GREM1, MLH1, MRE11A, MSH2, MSH6, MUTYH, NBN, NF1, PALB2, PMS2, POLD1, POLE, PTEN, RAD50, RAD51C, RAD51D, SMAD4, SMARCA4, STK11, and TP53 genes. No mutations were found in any of the 34 genes analyzed. This test involved sequencing and deletion/duplication analysis of all genes with the exception of EPCAM and GREM1 (deletions only).    A copy of the test report can be found in the Laboratory tab, dated 1/23/2019, and named \"[facility]". The report is scanned in as a linked document.    Interpretation:  We discussed several different interpretations of this inconclusive test result. It is not clear if any of these variants are " associated with increased cancer risk.  1. These variants may be a benign change that does not increase cancer risk.  2. These variants may be a harmful mutation associated with an increased risk for certain cancers/tumors.    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       Pathogenic (harmful) mutations in this gene are associated with DICER1 syndrome, also known as DICER1-pleuropulmonary blastoma familial tumor predisposition syndrome.     This syndrome is characterized by benign and malignant tumors, including pleuropulmonary blastoma, cystic nephroma, ovarian sex cord stromal tumors, multinodular goiter and thyroid cancer, embryonal rhabdomyosarcomas, ciliary body medulloepithelioma, nasal chondromesenchymal hamartomas, pituitary blastoma, as well as various other tumor types.      Due to the rarity of DICER1 syndrome and the fact that some individuals with a pathogenic variant will never develop symptoms, the specific lifetime risk of developing DICER1-related tumors is unknown. Many of the tumors develop in childhood and most present before the age of 40.    PALB2 variant of unknown significance p.S537L (c.1610C>T)    PALB2 mutations increase the lifetime risk of female breast cancer to approximately 33-58% over a woman s lifetime, compared to 12% in the general population.      PALB2 mutations have also been associated with an increased risk for pancreatic and possibly ovarian cancer.    If individuals are found to have a mutation in the PALB2 gene, we would recommend increased breast cancer screening at a younger age. Additional screening (such as pancreatic) may be considered based on family history.    In rare situations in which both parents have a harmful mutation in the PALB2 gene, their children each have a 25% risk  for Fanconi anemia. Fanconi anemia is a rare condition with onset in childhood.  Fanconi anemia often results in physical abnormalities, growth delays, bone marrow failure, and increased risk for cancer.  If this variant is later classified as harmful, Genaro would be considered a carrier for Fanconi Anemia. For individuals of childbearing age with PALB2 mutations, genetic counseling and genetic testing may be advised for their partners.    The laboratory is working to determine if any of these variants are harmful or benign, and they will contact me if any of them are reclassified. If these variants are determined to be benign, there may be a different gene or combination of genes and environment that are associated with the cancers in Genaro's relatives.  It is also important to consider that one of his close relatives may have had a mutation in one of the genes tested and he did not inherit it.      Inheritance:  We reviewed the autosomal dominant inheritance of the variants in the DICER1 and PALB2 genes.  We discussed that Genaro has a 50% chance to pass each of these variants to each of his children. Likewise, there is a 50% chance for each of these variants to be present in each of his siblings. Because it is unclear what, if any, risk is associated with these variants, clinical genetic testing for these two variants alone is not recommended for relatives.    Family Studies:  The laboratory may offer additional research based testing for specific relatives in order to help reclassify these variants.    Screening:  Based on this inconclusive test result, it is important for Genaro and his relatives to refer back to the family history for appropriate cancer screening.      We discussed that Genaro likely has some increased risk for pancreatic given his mother's history, but routine screening is typically not recommended at this time.  Genaro is encouraged to discuss this history with his care providers.         Other population cancer screening options, such as those recommended by the American Cancer Society and the National Comprehensive Cancer Network (NCCN), are also appropriate for Genaro at this time. These screening recommendations may change if there are changes to Genaro's personal and/or family history. Final screening recommendations should be made by each individual's managing physician.    Additional Testing Considerations:  Although Genaro's genetic testing result is inconclusive, other relatives may still carry a harmful gene mutation associated with an increased risk for cancer. Of note, his paternal half sister did complete genetic testing which identified a BRCA1 or BRCA2 gene mutation, which was reportedly inherited from their mother (not blood related to Genaro).  Genetic counseling is recommended for his maternal first cousin who was diagnosed with breast and ovarian cancer, as well as his full sister due to their mother's history of pancreatic cancer.  If any of these relatives do pursue additional genetic testing, Genaro is encouraged to contact me so that we may review the impact of their test results on Genaro.    Summary:  It is important that Genaro continue with cancer screening based on his personal and family history as discussed above.  Genetic testing is rapidly advancing, and new cancer susceptibility genes will most likely be identified in the future. Therefore, I encouraged Genaro to contact me annually or if there are changes in his personal or family history. This may change how we assess his cancer risk, screening, and the testing we would offer.    Plan:  1.  I provided Genaro with a copy of his test results today.    2. He should follow-up with his primary care provider for management.  3. He should contact me annually, or sooner if his family history changes.  4. I will contact Genaro if the laboratory informs me that these variants have been reclassified.  This may change screening  and testing recommendations for Genaro and his relatives.    If Genaro has any further questions, I encouraged him to contact me at [phone].    Time spent face to face: 15 minutes    Karishma Snider MS, [facility]  Licensed Genetic Counselor  [phone]

## 2019-03-29 NOTE — PATIENT INSTRUCTIONS
Genetic Testing  You had a blood test that looked at the genetic information in one or more genes associated with increased cancer risk.  The testing looked for any harmful changes that would stop this particular gene from working like it should.  It is important to remember that everyone has variants in multiple genes in their bodies that do not affect how the gene works.  These changes are benign and do not cause disease or increase cancer risk.  The term often used to describe these variants is benign polymorphism.  If an individual is found to have a benign polymorphism, their genetic test result is reported as Negative.    Results  There are three possible results of any genetic test:    Positive--a harmful mutation was identified    Negative--no mutation was identified    Variant of unknown significance--a variation in one of the genes was identified, but it is unclear how this impacts cancer risk in the family    Variant of Unknown Significance (VUS)  A VUS was identified in your blood sample.  Scientists currently do not know if this variant is harmful or if it is a benign polymorphism not associated with any increased risk of cancer.   There are several reasons for this lack of knowledge, but likely your VUS has either never been seen before or has only been seen in a small number of individuals.  Until your VUS is studied in more detail and/or seen in more families, scientists are not able to predict if it is a harmful mutation or a benign polymorphism.  Therefore, the cause of the cancer in you and your relatives is still unknown.          Reclassification  Genetic testing laboratories and researchers are constantly working to determine the importance of variants of unknown significance.  Most laboratories will notify the genetic counselor when a patient s VUS has been reclassified as a harmful mutation or a benign polymorphism.      Some families may be candidates for participation in the  laboratory s variant research programs to help determine the importance of their VUS.  Only the testing laboratory can decide who is eligible for participation.  Participating in these programs does not guarantee that families will learn the significance of their VUS immediately.  It could be months or years before a VUS is reclassified.       Screening Recommendations  Cancer screening recommendations for patients with a VUS should be based on their personal and family history rather than the VUS itself.  This may include increased cancer screening for certain individuals in the family.  Your genetic counselor and health care provider can help make appropriate recommendations for you and your relatives.      It is usually not recommended that other relatives have genetic testing for the VUS unless it is done as part of the laboratory s variant research program because an individual s test results should not influence their cancer screening until we determine the importance of the VUS.  Your genetic counselor can help you and your relatives understand the risks and benefits of all genetic testing and cancer screening options.    Please call us if you have any questions or concerns.     Please call us if you have any questions or concerns.   Cancer Risk Management Program [id number] ([phone])  ? Yina Burns, MS, [facility]  [phone]  ? Diamond Basurto, MS, [facility]  [phone]  ? Karishma Snider, MS, [facility]  [phone]  ? Dale Yañez, MS, [facility]  [phone]  ? Areli Hurley, MS, [facility] [phone]